# Patient Record
Sex: MALE | Race: BLACK OR AFRICAN AMERICAN | NOT HISPANIC OR LATINO | URBAN - METROPOLITAN AREA
[De-identification: names, ages, dates, MRNs, and addresses within clinical notes are randomized per-mention and may not be internally consistent; named-entity substitution may affect disease eponyms.]

---

## 2023-08-18 ENCOUNTER — EMERGENCY (EMERGENCY)
Facility: HOSPITAL | Age: 28
LOS: 1 days | Discharge: HOME | End: 2023-08-18
Admitting: EMERGENCY MEDICINE
Payer: MEDICAID

## 2023-08-18 VITALS
SYSTOLIC BLOOD PRESSURE: 122 MMHG | DIASTOLIC BLOOD PRESSURE: 77 MMHG | RESPIRATION RATE: 16 BRPM | TEMPERATURE: 98 F | OXYGEN SATURATION: 100 % | HEART RATE: 80 BPM

## 2023-08-18 PROCEDURE — 99053 MED SERV 10PM-8AM 24 HR FAC: CPT

## 2023-08-18 PROCEDURE — 99283 EMERGENCY DEPT VISIT LOW MDM: CPT

## 2023-08-18 NOTE — ED PROVIDER NOTE - NSFOLLOWUPINSTRUCTIONS_ED_ALL_ED_FT
Rest, drink plenty of fluids.  Advance activity as tolerated.  Continue all previously prescribed medications as directed.  Follow up with your primary care physician in 48-72 hours- bring copies of your results.  Return to the ER for worsening or persistent symptoms, and/or ANY NEW OR CONCERNING SYMPTOMS. If you have issues obtaining follow up, please call: [phone] to obtain a doctor or specialist who takes your insurance in your area.  You may call [phone] to make an appointment with the internal medicine clinic.    We were unable to communicate with Mr. Cooper, please send patient back if symptoms of asthma worsen, also with instructions of medications where to send medications, and with .

## 2023-08-18 NOTE — PROVIDER CONTACT NOTE (OTHER) - BACKGROUND
available. Writer contacted [facility] [employer] #[phone] and spoke with Officer Kristofer who reports that they have no number for tent shelter. It is said to although be on the grounds of [facility] it

## 2023-08-18 NOTE — ED PROVIDER NOTE - PATIENT PORTAL LINK FT
You can access the FollowMyHealth Patient Portal offered by [facility] by registering at the following website: [email or web address]. By joining [facility]’s FollowMyHealth portal, you will also be able to view your health information using other applications (apps) compatible with our system.

## 2023-08-18 NOTE — ED PROVIDER NOTE - OBJECTIVE STATEMENT
This is a 28-year-old male with a known history of asthma on albuterol and ephedrine pills.  Was sent in by the migrant camp at [facility] for medication refill.  However while patient was in the ER unable to obtain proper communication as patient does not read or write and only speaks wollof.  Several attempts were made by writer and other staff members to communicate with the patient along with a ANM.  Also contact was made with [facility] and unable to get a  to accommodate.  Language line solution does not have any  available at this time for [place].  Patient currently stable no wheezing or shortness of breath at this time no respiratory distress no signs of acute infection.  Vitals are stable patient does not have any wheezing at this time will discharge and recommend patient to return back when we have proper communication line.As per triage note patient wanted a refill of his asthma pill.

## 2023-08-18 NOTE — PROVIDER CONTACT NOTE (OTHER) - ASSESSMENT
is completely separate and if needed to communicate with them officers said to have to go there in person. She could only provide the location of "[facility]" between [facility] AND [facility]. Writer spoke with PA who then joined team who remained searching for available translation services, pt and  on site.  contact with shelter providing phone #[phone]. Told that pt could be sent back. Officer reports that shelter staff as well does not know the information for medication pt requesting as info and communication is limited and no info directly for pt. PA has decided on pt discharge. Pt in agreement with rosalie MORENO's TAXI [facility] by . Pt awaiting . Verbal huddle occurred with interdisciplinary team.

## 2023-08-18 NOTE — PROVIDER CONTACT NOTE (OTHER) - SITUATION
Writer notified by ANUSHKA Aviles that pt arrives from [facility] shelter on grounds of [facility] for medication refill? looking for contact information as  (Jaylon) for pt not found to be

## 2023-08-18 NOTE — ED PROVIDER NOTE - CLINICAL SUMMARY MEDICAL DECISION MAKING FREE TEXT BOX
This is a 28-year-old male with a known history of asthma on albuterol and ephedrine pills.  Was sent in by the migrant camp at [facility] for medication refill. Medication refill will recommend the center to return if symptoms worsen. Also recommend to send .

## 2023-08-18 NOTE — ED ADULT TRIAGE NOTE - CHIEF COMPLAINT QUOTE
*Jaylno speaking* #[id number]- arrives from shelter, requesting "pill" that he takes for asthma, no distress *Jaylon speaking* #[id number]- arrives from [facility], requesting "pill" that he takes for asthma, no distress

## 2025-07-26 NOTE — ED PROVIDER NOTE - WET READ LAUNCH FT
No care due was identified.  [facility] Embedded Care Due Messages. Reference number: [id number].   7/26/2025 7:56:05 AM CDT   There are no Wet Read(s) to document.